# Patient Record
(demographics unavailable — no encounter records)

---

## 2024-11-26 NOTE — PLAN
[FreeTextEntry1] :  DM under control cont' metformin 1g bid NCS diet monitor Hb A1c Hyperlipidemia cont' low fat/chol diet, con't statin therapy lipitor 40mg daily HTN under control cont' cozaar 100mg, amlodipine 5mg daily CAD S/P stent placement, cont' plavix 75mg daily, follow w/ cardiology regular exercise, healthy diet discussed with pt Clinical OP ( history of L fracture ) w/ DEXA 2021 showing osteopenia range, con't calcium 1200-1500mg and vit D 9688-5544 IU daily and regular weight bearing exercise, boniva 150mg monthly, will repeat DEXA to reassess RTC in 6 month follow up

## 2024-11-26 NOTE — PLAN
[FreeTextEntry1] :  DM under control cont' metformin 1g bid NCS diet monitor Hb A1c Hyperlipidemia cont' low fat/chol diet, con't statin therapy lipitor 40mg daily HTN under control cont' cozaar 100mg, amlodipine 5mg daily CAD S/P stent placement, cont' plavix 75mg daily, follow w/ cardiology regular exercise, healthy diet discussed with pt Clinical OP ( history of L fracture ) w/ DEXA 2021 showing osteopenia range, con't calcium 1200-1500mg and vit D 6508-8962 IU daily and regular weight bearing exercise, boniva 150mg monthly, will repeat DEXA to reassess RTC in 6 month follow up

## 2024-11-26 NOTE — PHYSICAL EXAM
[Well Nourished] : well nourished [Well Developed] : well developed [Normal Sclera/Conjunctiva] : normal sclera/conjunctiva [PERRL] : pupils equal round and reactive to light [EOMI] : extraocular movements intact [Normal Outer Ear/Nose] : the outer ears and nose were normal in appearance [No JVD] : no jugular venous distention [No Lymphadenopathy] : no lymphadenopathy [Thyroid Normal, No Nodules] : the thyroid was normal and there were no nodules present [Normal S1, S2] : normal S1 and S2 [No Extremity Clubbing/Cyanosis] : no extremity clubbing/cyanosis [Soft] : abdomen soft [Non Tender] : non-tender [Non-distended] : non-distended [Normal Anterior Cervical Nodes] : no anterior cervical lymphadenopathy [Coordination Grossly Intact] : coordination grossly intact [Normal Insight/Judgement] : insight and judgment were intact [Normal Appearance] : normal in appearance [No Masses] : no palpable masses [No CVA Tenderness] : no CVA  tenderness [No Spinal Tenderness] : no spinal tenderness [de-identified] : 1-2/6 murmur [No Acute Distress] : no acute distress [Well-Appearing] : well-appearing [Supple] : supple [No Respiratory Distress] : no respiratory distress  [No Accessory Muscle Use] : no accessory muscle use [Clear to Auscultation] : lungs were clear to auscultation bilaterally [Normal Rate] : normal rate  [Regular Rhythm] : with a regular rhythm [No Edema] : there was no peripheral edema [Grossly Normal Strength/Tone] : grossly normal strength/tone [Normal Gait] : normal gait [Normal Affect] : the affect was normal [Alert and Oriented x3] : oriented to person, place, and time

## 2024-11-26 NOTE — HISTORY OF PRESENT ILLNESS
[FreeTextEntry1] : follow up DM HTN hyperlipidemia CAD [de-identified] : pt has been doing well without any acute illness or injury, compliant with all meds pt regularly follows with cardiology, seen last month. pt denies any significant CP/SOB during walking pt tries to be on low calorie diet, do regular walking exercise

## 2024-11-26 NOTE — PHYSICAL EXAM
[Well Nourished] : well nourished [Well Developed] : well developed [Normal Sclera/Conjunctiva] : normal sclera/conjunctiva [PERRL] : pupils equal round and reactive to light [EOMI] : extraocular movements intact [Normal Outer Ear/Nose] : the outer ears and nose were normal in appearance [No JVD] : no jugular venous distention [No Lymphadenopathy] : no lymphadenopathy [Thyroid Normal, No Nodules] : the thyroid was normal and there were no nodules present [Normal S1, S2] : normal S1 and S2 [No Extremity Clubbing/Cyanosis] : no extremity clubbing/cyanosis [Soft] : abdomen soft [Non Tender] : non-tender [Non-distended] : non-distended [Normal Anterior Cervical Nodes] : no anterior cervical lymphadenopathy [Coordination Grossly Intact] : coordination grossly intact [Normal Insight/Judgement] : insight and judgment were intact [Normal Appearance] : normal in appearance [No Masses] : no palpable masses [No CVA Tenderness] : no CVA  tenderness [No Spinal Tenderness] : no spinal tenderness [de-identified] : 1-2/6 murmur [No Acute Distress] : no acute distress [Well-Appearing] : well-appearing [Supple] : supple [No Respiratory Distress] : no respiratory distress  [No Accessory Muscle Use] : no accessory muscle use [Clear to Auscultation] : lungs were clear to auscultation bilaterally [Normal Rate] : normal rate  [Regular Rhythm] : with a regular rhythm [No Edema] : there was no peripheral edema [Grossly Normal Strength/Tone] : grossly normal strength/tone [Normal Gait] : normal gait [Normal Affect] : the affect was normal [Alert and Oriented x3] : oriented to person, place, and time

## 2024-11-26 NOTE — HISTORY OF PRESENT ILLNESS
[FreeTextEntry1] : follow up DM HTN hyperlipidemia CAD [de-identified] : pt has been doing well without any acute illness or injury, compliant with all meds pt regularly follows with cardiology, seen last month. pt denies any significant CP/SOB during walking pt tries to be on low calorie diet, do regular walking exercise

## 2024-11-26 NOTE — HEALTH RISK ASSESSMENT
[Very Good] : ~his/her~  mood as very good [No] : In the past 12 months have you used drugs other than those required for medical reasons? No [No falls in past year] : Patient reported no falls in the past year [0] : 2) Feeling down, depressed, or hopeless: Not at all (0) [PHQ-2 Negative - No further assessment needed] : PHQ-2 Negative - No further assessment needed [de-identified] : walking [de-identified] : low fat [[id number]] : 0 [Never] : Never [No Retinopathy] : No retinopathy [EyeExamDate] : every 6 months [Patient reported mammogram was normal] : Patient reported mammogram was normal [Patient reported colonoscopy was normal] : Patient reported colonoscopy was normal [HIV test declined] : HIV test declined [Hepatitis C test declined] : Hepatitis C test declined [Change in mental status noted] : No change in mental status noted [Language] : denies difficulty with language [Behavior] : denies difficulty with behavior [Learning/Retaining New Information] : denies difficulty learning/retaining new information [Handling Complex Tasks] : denies difficulty handling complex tasks [Reasoning] : denies difficulty with reasoning [Spatial Ability and Orientation] : denies difficulty with spatial ability and orientation [None] : None [Alone] : lives alone [Retired] : retired [] :  [# Of Children ___] : has [unfilled] children [High Risk Behavior] : no high risk behavior [Feels Safe at Home] : Feels safe at home [Fully functional (bathing, dressing, toileting, transferring, walking, feeding)] : Fully functional (bathing, dressing, toileting, transferring, walking, feeding) [Reports changes in hearing] : Reports no changes in hearing [Reports changes in vision] : Reports no changes in vision [Reports normal functional visual acuity (ie: able to read med bottle)] : Reports normal functional visual acuity [Reports changes in dental health] : Reports no changes in dental health [Smoke Detector] : smoke detector [Safety elements used in home] : safety elements used in home [Seat Belt] :  uses seat belt [TB Exposure] : is not being exposed to tuberculosis [Caregiver Concerns] : does not have caregiver concerns [MammogramDate] : 7-8 years ago [PapSmearComments] : S/P hysterectomy [BoneDensityDate] : 2021 [BoneDensityComments] : osteopenia range [ColonoscopyDate] : 2018 [de-identified] : needs some assistance with IADLs [Designated Healthcare Proxy] : Designated healthcare proxy [Name: ___] : Health Care Proxy's Name: [unfilled]  [Relationship: ___] : Relationship: [unfilled]

## 2024-11-26 NOTE — REVIEW OF SYSTEMS
[Joint Pain] : no joint pain [Joint Stiffness] : no joint stiffness [Joint Swelling] : no joint swelling [Muscle Weakness] : no muscle weakness [Muscle Pain] : no muscle pain [Back Pain] : back pain [Negative] : Neurological

## 2024-11-26 NOTE — HEALTH RISK ASSESSMENT
[Very Good] : ~his/her~  mood as very good [No] : In the past 12 months have you used drugs other than those required for medical reasons? No [No falls in past year] : Patient reported no falls in the past year [0] : 2) Feeling down, depressed, or hopeless: Not at all (0) [PHQ-2 Negative - No further assessment needed] : PHQ-2 Negative - No further assessment needed [de-identified] : walking [de-identified] : low fat [[id number]] : 0 [Never] : Never [No Retinopathy] : No retinopathy [EyeExamDate] : every 6 months [Patient reported mammogram was normal] : Patient reported mammogram was normal [Patient reported colonoscopy was normal] : Patient reported colonoscopy was normal [HIV test declined] : HIV test declined [Hepatitis C test declined] : Hepatitis C test declined [Change in mental status noted] : No change in mental status noted [Language] : denies difficulty with language [Behavior] : denies difficulty with behavior [Learning/Retaining New Information] : denies difficulty learning/retaining new information [Handling Complex Tasks] : denies difficulty handling complex tasks [Reasoning] : denies difficulty with reasoning [Spatial Ability and Orientation] : denies difficulty with spatial ability and orientation [None] : None [Alone] : lives alone [Retired] : retired [] :  [# Of Children ___] : has [unfilled] children [High Risk Behavior] : no high risk behavior [Feels Safe at Home] : Feels safe at home [Fully functional (bathing, dressing, toileting, transferring, walking, feeding)] : Fully functional (bathing, dressing, toileting, transferring, walking, feeding) [Reports changes in hearing] : Reports no changes in hearing [Reports changes in vision] : Reports no changes in vision [Reports normal functional visual acuity (ie: able to read med bottle)] : Reports normal functional visual acuity [Reports changes in dental health] : Reports no changes in dental health [Smoke Detector] : smoke detector [Safety elements used in home] : safety elements used in home [Seat Belt] :  uses seat belt [TB Exposure] : is not being exposed to tuberculosis [Caregiver Concerns] : does not have caregiver concerns [MammogramDate] : 7-8 years ago [PapSmearComments] : S/P hysterectomy [BoneDensityDate] : 2021 [BoneDensityComments] : osteopenia range [ColonoscopyDate] : 2018 [de-identified] : needs some assistance with IADLs [Designated Healthcare Proxy] : Designated healthcare proxy [Name: ___] : Health Care Proxy's Name: [unfilled]  [Relationship: ___] : Relationship: [unfilled]

## 2025-05-29 NOTE — HISTORY OF PRESENT ILLNESS
[FreeTextEntry1] : DM CAD HTN anemia [de-identified] : pt has been doing well without any acute illness or injury, compliant with all meds pt fell in the bathroom and fractured her left toes, being treated by podiatry - healing.  pt is following up with cardiology regularly

## 2025-05-29 NOTE — PHYSICAL EXAM
[No Acute Distress] : no acute distress [Well-Appearing] : well-appearing [Supple] : supple [No Respiratory Distress] : no respiratory distress  [No Accessory Muscle Use] : no accessory muscle use [Clear to Auscultation] : lungs were clear to auscultation bilaterally [Normal Rate] : normal rate  [Regular Rhythm] : with a regular rhythm [No Edema] : there was no peripheral edema [No Extremity Clubbing/Cyanosis] : no extremity clubbing/cyanosis [Soft] : abdomen soft [Non Tender] : non-tender [Non-distended] : non-distended [Grossly Normal Strength/Tone] : grossly normal strength/tone [Normal Gait] : normal gait [Normal Affect] : the affect was normal [Alert and Oriented x3] : oriented to person, place, and time [de-identified] : 1-2/6 murmur [de-identified] : mild tenderness on lateral part of left foot, no erythema no swelling.

## 2025-05-29 NOTE — PLAN
[FreeTextEntry1] :  DM cont' metformin 1g bid, check Hb A1c today, cont NCS diet Ophthalmology evaluation done this month as per pt CAD/HTN/HLD under control cont' lipitor 40mg plavix 75mg amlodipine 5mg losartan 100mg daily Anemia S/P iron supplement, repeat CBC today to monitor Lab ( venipuncture ) done today at this office Foot fracture, being treated by podiatry. pt advised to decrease weight bearing for now until complete healing of the fracture RTC in 6 month ROZ fasting

## 2025-05-29 NOTE — REVIEW OF SYSTEMS
[Joint Pain] : joint pain [Joint Swelling] : no joint swelling [Muscle Weakness] : no muscle weakness [Muscle Pain] : no muscle pain [Negative] : Neurological [FreeTextEntry9] : left foot pain from fracture - healing, able to walk